# Patient Record
Sex: MALE | Race: WHITE | NOT HISPANIC OR LATINO | Employment: FULL TIME | URBAN - METROPOLITAN AREA
[De-identification: names, ages, dates, MRNs, and addresses within clinical notes are randomized per-mention and may not be internally consistent; named-entity substitution may affect disease eponyms.]

---

## 2017-01-06 ENCOUNTER — APPOINTMENT (OUTPATIENT)
Dept: URGENT CARE | Facility: MEDICAL CENTER | Age: 57
End: 2017-01-06
Payer: OTHER MISCELLANEOUS

## 2017-01-06 ENCOUNTER — TRANSCRIBE ORDERS (OUTPATIENT)
Dept: URGENT CARE | Facility: MEDICAL CENTER | Age: 57
End: 2017-01-06

## 2017-01-06 ENCOUNTER — HOSPITAL ENCOUNTER (OUTPATIENT)
Dept: RADIOLOGY | Facility: MEDICAL CENTER | Age: 57
Discharge: HOME | End: 2017-01-06

## 2017-01-06 DIAGNOSIS — R52 PAIN: ICD-10-CM

## 2017-01-06 DIAGNOSIS — R52 PAIN: Primary | ICD-10-CM

## 2017-01-06 PROCEDURE — 99283 EMERGENCY DEPT VISIT LOW MDM: CPT

## 2017-01-06 PROCEDURE — G0382 LEV 3 HOSP TYPE B ED VISIT: HCPCS

## 2017-01-06 PROCEDURE — 72100 X-RAY EXAM L-S SPINE 2/3 VWS: CPT

## 2017-01-09 ENCOUNTER — APPOINTMENT (OUTPATIENT)
Dept: URGENT CARE | Facility: MEDICAL CENTER | Age: 57
End: 2017-01-09
Payer: OTHER MISCELLANEOUS

## 2017-01-09 PROCEDURE — 99212 OFFICE O/P EST SF 10 MIN: CPT

## 2017-01-31 ENCOUNTER — APPOINTMENT (OUTPATIENT)
Dept: URGENT CARE | Facility: MEDICAL CENTER | Age: 57
End: 2017-01-31
Payer: OTHER MISCELLANEOUS

## 2017-01-31 PROCEDURE — 99213 OFFICE O/P EST LOW 20 MIN: CPT

## 2017-02-07 ENCOUNTER — APPOINTMENT (OUTPATIENT)
Dept: URGENT CARE | Facility: MEDICAL CENTER | Age: 57
End: 2017-02-07
Payer: OTHER MISCELLANEOUS

## 2017-02-07 PROCEDURE — 99213 OFFICE O/P EST LOW 20 MIN: CPT

## 2017-02-15 ENCOUNTER — APPOINTMENT (OUTPATIENT)
Dept: URGENT CARE | Facility: MEDICAL CENTER | Age: 57
End: 2017-02-15
Payer: OTHER MISCELLANEOUS

## 2017-02-15 PROCEDURE — 99203 OFFICE O/P NEW LOW 30 MIN: CPT

## 2017-02-27 ENCOUNTER — ALLSCRIPTS OFFICE VISIT (OUTPATIENT)
Dept: OTHER | Facility: OTHER | Age: 57
End: 2017-02-27

## 2017-03-06 DIAGNOSIS — M54.50 LOW BACK PAIN: ICD-10-CM

## 2017-03-06 DIAGNOSIS — M54.9 DORSALGIA: ICD-10-CM

## 2017-03-09 ENCOUNTER — GENERIC CONVERSION - ENCOUNTER (OUTPATIENT)
Dept: OTHER | Facility: OTHER | Age: 57
End: 2017-03-09

## 2017-03-29 ENCOUNTER — ALLSCRIPTS OFFICE VISIT (OUTPATIENT)
Dept: OTHER | Facility: OTHER | Age: 57
End: 2017-03-29

## 2017-04-05 ENCOUNTER — APPOINTMENT (OUTPATIENT)
Dept: URGENT CARE | Facility: MEDICAL CENTER | Age: 57
End: 2017-04-05
Payer: OTHER MISCELLANEOUS

## 2017-04-05 PROCEDURE — 99213 OFFICE O/P EST LOW 20 MIN: CPT

## 2017-04-07 ENCOUNTER — GENERIC CONVERSION - ENCOUNTER (OUTPATIENT)
Dept: OTHER | Facility: OTHER | Age: 57
End: 2017-04-07

## 2017-04-13 ENCOUNTER — APPOINTMENT (OUTPATIENT)
Dept: URGENT CARE | Age: 57
End: 2017-04-13
Payer: OTHER MISCELLANEOUS

## 2017-04-13 PROCEDURE — 99213 OFFICE O/P EST LOW 20 MIN: CPT | Performed by: FAMILY MEDICINE

## 2017-04-19 DIAGNOSIS — M54.50 LOW BACK PAIN: ICD-10-CM

## 2017-04-19 DIAGNOSIS — M47.816 SPONDYLOSIS OF LUMBAR REGION WITHOUT MYELOPATHY OR RADICULOPATHY: ICD-10-CM

## 2017-04-19 DIAGNOSIS — M51.26 OTHER INTERVERTEBRAL DISC DISPLACEMENT, LUMBAR REGION: ICD-10-CM

## 2017-04-19 DIAGNOSIS — M54.9 DORSALGIA: ICD-10-CM

## 2017-04-21 ENCOUNTER — APPOINTMENT (OUTPATIENT)
Dept: URGENT CARE | Facility: MEDICAL CENTER | Age: 57
End: 2017-04-21
Payer: OTHER MISCELLANEOUS

## 2017-04-21 PROCEDURE — 99213 OFFICE O/P EST LOW 20 MIN: CPT

## 2017-05-05 ENCOUNTER — APPOINTMENT (OUTPATIENT)
Dept: URGENT CARE | Facility: MEDICAL CENTER | Age: 57
End: 2017-05-05
Payer: OTHER MISCELLANEOUS

## 2017-05-05 PROCEDURE — 99213 OFFICE O/P EST LOW 20 MIN: CPT

## 2017-05-10 ENCOUNTER — ALLSCRIPTS OFFICE VISIT (OUTPATIENT)
Dept: OTHER | Facility: OTHER | Age: 57
End: 2017-05-10

## 2017-05-11 ENCOUNTER — GENERIC CONVERSION - ENCOUNTER (OUTPATIENT)
Dept: OTHER | Facility: OTHER | Age: 57
End: 2017-05-11

## 2017-05-16 ENCOUNTER — GENERIC CONVERSION - ENCOUNTER (OUTPATIENT)
Dept: OTHER | Facility: OTHER | Age: 57
End: 2017-05-16

## 2017-05-24 ENCOUNTER — GENERIC CONVERSION - ENCOUNTER (OUTPATIENT)
Dept: OTHER | Facility: OTHER | Age: 57
End: 2017-05-24

## 2017-05-26 ENCOUNTER — GENERIC CONVERSION - ENCOUNTER (OUTPATIENT)
Dept: OTHER | Facility: OTHER | Age: 57
End: 2017-05-26

## 2017-05-26 DIAGNOSIS — M51.36 OTHER INTERVERTEBRAL DISC DEGENERATION, LUMBAR REGION: ICD-10-CM

## 2017-05-26 DIAGNOSIS — M47.816 SPONDYLOSIS OF LUMBAR REGION WITHOUT MYELOPATHY OR RADICULOPATHY: ICD-10-CM

## 2017-05-26 DIAGNOSIS — M54.50 LOW BACK PAIN: ICD-10-CM

## 2017-06-15 ENCOUNTER — GENERIC CONVERSION - ENCOUNTER (OUTPATIENT)
Dept: OTHER | Facility: OTHER | Age: 57
End: 2017-06-15

## 2017-08-21 ENCOUNTER — APPOINTMENT (OUTPATIENT)
Dept: LAB | Facility: MEDICAL CENTER | Age: 57
End: 2017-08-21
Payer: COMMERCIAL

## 2017-08-21 ENCOUNTER — TRANSCRIBE ORDERS (OUTPATIENT)
Dept: ADMINISTRATIVE | Facility: HOSPITAL | Age: 57
End: 2017-08-21

## 2017-08-21 DIAGNOSIS — I10 ESSENTIAL HYPERTENSION, BENIGN: Primary | ICD-10-CM

## 2017-08-21 DIAGNOSIS — Z12.5 SPECIAL SCREENING FOR MALIGNANT NEOPLASM OF PROSTATE: ICD-10-CM

## 2017-08-21 LAB
ALBUMIN SERPL BCP-MCNC: 4.1 G/DL (ref 3.5–5)
ALP SERPL-CCNC: 59 U/L (ref 46–116)
ALT SERPL W P-5'-P-CCNC: 28 U/L (ref 12–78)
ANION GAP SERPL CALCULATED.3IONS-SCNC: 7 MMOL/L (ref 4–13)
AST SERPL W P-5'-P-CCNC: 14 U/L (ref 5–45)
BILIRUB SERPL-MCNC: 0.77 MG/DL (ref 0.2–1)
BUN SERPL-MCNC: 11 MG/DL (ref 5–25)
CALCIUM SERPL-MCNC: 9.4 MG/DL (ref 8.3–10.1)
CHLORIDE SERPL-SCNC: 104 MMOL/L (ref 100–108)
CHOLEST SERPL-MCNC: 282 MG/DL (ref 50–200)
CO2 SERPL-SCNC: 26 MMOL/L (ref 21–32)
CREAT SERPL-MCNC: 0.88 MG/DL (ref 0.6–1.3)
GFR SERPL CREATININE-BSD FRML MDRD: 94 ML/MIN/1.73SQ M
GLUCOSE P FAST SERPL-MCNC: 102 MG/DL (ref 65–99)
HDLC SERPL-MCNC: 59 MG/DL (ref 40–60)
LDLC SERPL CALC-MCNC: 171 MG/DL (ref 0–100)
LDLC SERPL DIRECT ASSAY-MCNC: 185 MG/DL (ref 0–100)
POTASSIUM SERPL-SCNC: 4.1 MMOL/L (ref 3.5–5.3)
PROT SERPL-MCNC: 7.7 G/DL (ref 6.4–8.2)
PSA SERPL-MCNC: 1.5 NG/ML (ref 0–4)
SODIUM SERPL-SCNC: 137 MMOL/L (ref 136–145)
TRIGL SERPL-MCNC: 262 MG/DL

## 2017-08-21 PROCEDURE — 80061 LIPID PANEL: CPT | Performed by: FAMILY MEDICINE

## 2017-08-21 PROCEDURE — 80053 COMPREHEN METABOLIC PANEL: CPT | Performed by: FAMILY MEDICINE

## 2017-08-21 PROCEDURE — 83721 ASSAY OF BLOOD LIPOPROTEIN: CPT | Performed by: FAMILY MEDICINE

## 2017-08-21 PROCEDURE — 84153 ASSAY OF PSA TOTAL: CPT | Performed by: FAMILY MEDICINE

## 2017-08-21 PROCEDURE — 36415 COLL VENOUS BLD VENIPUNCTURE: CPT | Performed by: FAMILY MEDICINE

## 2018-01-12 NOTE — MISCELLANEOUS
Message   Recorded as Task   Date: 06/13/2017 08:14 AM, Created By: Jessy Cardoso   Task Name: Miscellaneous   Assigned To: Jessy Cardoso   Regarding Patient: Raquel Bustamante, Status: Active   CommentChris Heller - 13 Jun 2017 8:14 AM     TASK CREATED  PT LM W/SERVICE TO CX APPT FOR 6/19/17  NO REASON PROVIDED   Renita Piña - 15 Nima 2017 7:08 AM     TASK REPLIED TO: Previously Assigned To Renita Piña        Active Problems    1  Abdominal pain (789 00) (R10 9)   2  Acute maxillary sinusitis (461 0) (J01 00)   3  Anxiety (300 00) (F41 9)   4  Arthritis (716 90) (M19 90)   5  Backache (724 5) (M54 9)   6  Benign prostatic hypertrophy (600 00) (N40 0)   7  Bulging of lumbar intervertebral disc without myelopathy (722 10) (M51 26)   8  Cataract (366 9) (H26 9)   9  Concentration deficit (799 51) (R41 840)   10  Depression (311) (F32 9)   11  Disc degeneration, lumbar (722 52) (M51 36)   12  Hyperlipidemia (272 4) (E78 5)   13  Hypertension (401 9) (I10)   14  Lumbar back pain (724 2) (M54 5)   15  Lumbar spondylosis (721 3) (M47 816)   16  Memory loss, short term (780 93) (R41 3)   17  Need for prophylactic vaccination and inoculation against influenza (V04 81) (Z23)   18  Other acute sinusitis (461 8) (J01 80)   19  Seasonal allergies (477 9) (J30 2)   20  Shoulder Pain Elicited During Impingement Test    Current Meds   1  Flexeril 5 MG TABS (Cyclobenzaprine HCl); TAKE 1 TABLET 3 TIMES DAILY AS   NEEDED; Therapy: (Recorded:32Vlx3455) to Recorded   2  Meloxicam 15 MG Oral Tablet; TAKE 1 TABLET ONCE DAILY WITH A MEAL; Therapy: 89CUO9920 to (Evaluate:27Jun2017)  Requested for: 99OOQ7082; Last   Rx:29Mar2017 Ordered   3  Multivitamins CAPS Recorded   4  Tamsulosin HCl - 0 4 MG Oral Capsule; take 1 capsule daily; Therapy: 42Flk4572 to (Evaluate:44Xzn3635)  Requested for: 27ULY5425; Last   Rx:23Jan2014 Ordered    Allergies    1  Penicillins    Signatures   Electronically signed by :  Dee Bloch Isela Butcher, ; Nima 15 2017  7:36AM EST                       (Author)

## 2018-01-12 NOTE — MISCELLANEOUS
Message   Recorded as Task   Date: 2017 11:35 AM, Created By: Jessica Amaya   Task Name: Miscellaneous   Assigned To: Sergo Gomez clinical,Team   Regarding Patient: Nicole Bryson, Status: Active   Comment:    Anastacia Denny - 26 May 2017 11:35 AM     TASK CREATED  Leidy from [facility] [address]  called stating pt has an appt today at 2:00 and PT script   Pls fax new PT script (dated today) to [phone]  Edwinmarsha Burrell can be reached at [phone]  Ada Mackenzie - 26 May 2017 11:47 AM     TASK EDITED        ***Dr Francoise Rothman pt***    Can you put a new PT script in allscipts, looks like  wrote the last one on  to continue PT for 2 more wks and [facility] is now requesting another one to be faxed today for pt's 2pm appt  Wolfgang Marlow - 26 May 2017 12:23 PM     TASK REPLIED TO: Previously Assigned To Wolfgang Marlow  I put in a new PT order to last until his next OV with Seadrift Wenceslao  Thank you  Ada Mackenzie - 26 May 2017 12:40 PM     TASK EDITED  New PT script was faxed to number provided for [facility], chris Forbes  Active Problems    1  Abdominal pain (789 00) (R10 9)   2  Acute maxillary sinusitis (461 0) (J01 00)   3  Anxiety (300 00) (F41 9)   4  Arthritis (716 90) (M19 90)   5  Backache (724 5) (M54 9)   6  Benign prostatic hypertrophy (600 00) (N40 0)   7  Bulging of lumbar intervertebral disc without myelopathy (722 10) (M51 26)   8  Cataract (366 9) (H26 9)   9  Concentration deficit (799 51) (R41 840)   10  Depression (311) (F32 9)   11  Disc degeneration, lumbar (722 52) (M51 36)   12  Hyperlipidemia (272 4) (E78 5)   13  Hypertension (401 9) (I10)   14  Lumbar back pain (724 2) (M54 5)   15  Lumbar spondylosis (721 3) (M47 816)   16  Memory loss, short term (780 93) (R41 3)   17  Need for prophylactic vaccination and inoculation against influenza (V04 81) (Z23)   18  Other acute sinusitis (461 8) (J01 80)   19   Seasonal allergies (477 9) (J30 2) 20  Shoulder Pain Elicited During Impingement Test    Current Meds   1  Flexeril 5 MG TABS (Cyclobenzaprine HCl); TAKE 1 TABLET 3 TIMES DAILY AS   NEEDED; Therapy: (Recorded:43Pbw7913) to Recorded   2  Meloxicam 15 MG Oral Tablet; TAKE 1 TABLET ONCE DAILY WITH A MEAL; Therapy: 14GYE1774 to (Evaluate:27Jun2017)  Requested for: 28KGF2584; Last   Rx:29Mar2017 Ordered   3  Multivitamins CAPS Recorded   4  Tamsulosin HCl - 0 4 MG Oral Capsule; take 1 capsule daily; Therapy: 41Grn5077 to (Evaluate:23May2014)  Requested for: 65SUL8999; Last   Rx:23Jan2014 Ordered    Allergies    1   Penicillins    Signatures   Electronically signed by : Yunior Ontiveros, ; May 26 2017 12:40PM EST                       (Author)

## 2018-01-13 VITALS
HEIGHT: 73 IN | WEIGHT: 210 LBS | RESPIRATION RATE: 16 BRPM | SYSTOLIC BLOOD PRESSURE: 162 MMHG | DIASTOLIC BLOOD PRESSURE: 88 MMHG | HEART RATE: 96 BPM | BODY MASS INDEX: 27.83 KG/M2

## 2018-01-13 NOTE — CONSULTS
Assessment    1  Backache (724 5) (M54 9)   2  Lumbar back pain (724 2) (M54 5)   3  Disc degeneration, lumbar (722 52) (M51 36)    Plan  Disc degeneration, lumbar    · Follow-up PRN Evaluation and Treatment  Follow-up  Status: Complete  Done:  30AOO8479   Ordered; For: Disc degeneration, lumbar; Ordered By: Leda Lawson Performed:  Due: 09XCP4575   · 1 - Scott Thakur MD, Bill Lebron (Pain Management) Physician Referral  Consult Only: the  expectation is that the referring provider will communicate back to the patient on  treatment options  Evaluation and Treatment: the expectation is that the referred to  provider will communicate back to the patient on treatment options  please assess for back pain  Status: Hold For - Scheduling  Requested for: 14YOT7252   Ordered; For: Disc degeneration, lumbar; Ordered By: Leda Lawson Performed:  Due: 97CSZ5510; Last Updated By: Michelle Martinez; 2/27/2017 9:25:28 AM  Care Summary provided  : Yes    Discussion/Summary    Mr Brynn Terry is being referred by Dr Parish Zeng  He has acute on chronic back pain that is improving with conservative medical management  today we reviewed these options including PT, TARYN and medications  I explained to him that surgery would be no guarantee of pain relief  I encouraged him to continue to pursue PT and referred him to Dr Scott Thakur  I will see him on a PRN basis  The patient has the current Goals: Improve function  The patent has the current Barriers: None  Patient is able to Self-Care  Self Referrals: No      Chief Complaint    1  Back Pain  Chronic low back pain with acute exacerbation x 6 weeks, episodic right leg radiation, improved with activity modification and PT  denies weakness, bowel or bladder  History of Present Illness    [facility] presents with complaints of gradual onset of constant episodes of mild bilateral lower back pain, described as dull and aching, radiating to the right buttock   On a scale of 1 to 10, the patient rates the pain as 3  Episodes about years ago  Symptoms are made worse by prolonged standing, prolonged sitting and lifting  Symptoms are worsening  Associated symptoms include spasm and stiffness, but no fever, no night sweats, no abdominal pain, no general malaise, no weight loss, no arm numbness, no leg numbness, no arm weakness, no urinary incontinence, no fecal incontinence, no urinary retention and no rash localized to the area of pain    leg weakness (occasional bilateral LE weakness)  Review of Systems    Constitutional: no fever and no chills  Eyes: no eyesight problems  ENT: no hearing loss  Cardiovascular: no chest pain and no palpitations  Respiratory: no shortness of breath, no cough and no wheezing  Gastrointestinal: no nausea  Genitourinary: no incontinence  Musculoskeletal: joint stiffness, but as noted in HPI, no joint swelling, no limb pain, no myalgias and no limb swelling    The patient presents with complaints of gradual onset of mild lower back arthralgias  Neurological: limb weakness, but as noted in HPI, no headache, no numbness, no tingling, no confusion, no dizziness, no convulsions, no fainting and no difficulty walking  Psychiatric: anxiety, but no depression  Endocrine: no muscle weakness  Hematologic/Lymphatic: no tendency for easy bleeding and no tendency for easy bruising  ROS reviewed  Active Problems    1  Abdominal pain (789 00) (R10 9)   2  Acute maxillary sinusitis (461 0) (J01 00)   3  Backache (724 5) (M54 9)   4  Benign prostatic hypertrophy (600 00) (N40 0)   5  Depression (311) (F32 9)   6  Hyperlipidemia (272 4) (E78 5)   7  Hypertension (401 9) (I10)   8  Need for prophylactic vaccination and inoculation against influenza (V04 81) (Z23)   9  Other acute sinusitis (461 8) (J01 80)   10  Shoulder Pain Elicited During Impingement Test    Past Medical History    1  History of Denial Of Any Significant Medical History   2   Need for prophylactic vaccination and inoculation against influenza (V04 81) (Z23)   3  History of Urinary Tract Infection (V13 02)    The active problems and past medical history were reviewed and updated today  Surgical History    1  History of Eye Surgery   2  History of Sinus Surgery    The surgical history was reviewed and updated today  Family History  Mother    1  Family history of Diabetes Mellitus (V18 0)   2  Family history of Father  At Age 78  Father    3  Family history of Alzheimer Disease  Family History    4  Family history of Family Health Status Of Mother - Alive    The family history was reviewed and updated today  Social History    · Being A Social Drinker   · Caffeine Use   · Current Every Day Smoker (305 1)  The social history was reviewed and updated today  Current Meds   1  Flexeril 5 MG TABS; TAKE 1 TABLET 3 TIMES DAILY AS NEEDED; Therapy: (Recorded:83Pst5987) to Recorded   2  Multivitamins CAPS Recorded   3  Naproxen 250 MG Oral Tablet; TAKE 1 TABLET EVERY 12 HOURS AS NEEDED; Therapy: (Recorded:97Gaz1807) to Recorded   4  Tamsulosin HCl - 0 4 MG Oral Capsule; take 1 capsule daily; Therapy: 73Nwf7863 to (Evaluate:95Qkf5841)  Requested for: 58QJF4062; Last   Rx:2014 Ordered    Allergies    1  Penicillins    Vitals  Vital Signs    Recorded: 2017 08:55AM   Temperature 98 7 F   Heart Rate 103   Respiration 16   Systolic 187   Diastolic 485   Height 6 ft 1 in   Weight 206 lb 2 oz   BMI Calculated 27 2   BSA Calculated 2 18   Pain Scale 3     Physical Exam   (full strength bilateral LE, grossly intact sensatoin and DTR, negative SLR, mildly antalgic gait, paravertebral spasm, able to toe and heel walk)   Constitutional Patient appears healthy and well developed  No signs of acute distress present  Eyes Vision is grossly normal  Discs flat with sharp margins  Respiratory Auscultation of lungs: Clear to auscultation bilaterally     Cardiovascular Auscultation of heart: Rate is regular  Rhythm is regular  No heart murmur appreciated  Carotid pulses: 2+ and equal bilaterally, without bruits  Peripheral vascular exam: Pedal Pulses: 2+ and equal bilaterally  Radial pulses: 2+ and equal bilaterally  Extremities: No edema of the lower limbs bilaterally  Abdomen The abdomen is flat  No abdominal scars  Abdomen is soft, nontender, and nondistended  Anus, perineum, and rectum: Exam deferred  Neurologic - Mental Status: Alert and Oriented x3  Mood and affect: Affect is normal   Memory is grossly intact  Attention is WNL  Speech is articulated and fluent  Knowledge and vocabulary consistent with education  Cranial Nerve Exam:  2nd cranial nerve: Visual field was full to confrontation  3rd, 4th, and 6th cranial nerves: Normal with no deficit  5th cranial nerve: Sensation was intact in all three divisions to light touch and temperature  Motor function was intact  7th cranial nerve: Face symmetrical at grimace and at rest  8th cranial nerve: Grossly intact to finger rub bilaterally  9th and 10th cranial nerves: Uvula is midline  11th cranial nerve: Shoulder shrug equal bilaterally  12th cranial nerve: Tongue mideline, no atrophy present  Motor System General Motor Strength: No pronator drift and no parietal drift  Motor System - Upper Extremities: Normal to inspection and palpation  Strength: Deltoids 5/5 bilaterally  Biceps 5/5 bilaterally  Triceps 5/5 bilaterally  Extensor carpi radials is 5/5 bilaterally  Extensor digitorum 5/5 bilaterally  Intrinsic 5/5 bilaterally   5/5 bilaterally  Muscle tone: Normal bilaterally  Muscle Bulk: Normal bilaterally  Motor System - Lower Extremities: Normal to inspection and palpation  Strength: Iliopsoas 5/5 bilaterally  Quadriceps 5/5 bilaterally  Hamstrings 5/5 bilaterally  Gastrocnemius 5/5 bilaterally  Muscle tone: Normal bilaterally  Reflexes: Biceps reflexes are 2+ bilaterally   Triceps reflexes are 2+ bilaterally  Achilles reflexes are 2+ bilaterally  Babinski's reflex is 2+ down going bilaterally  Ankle clonus is absent bilaterally  Coordination: Finger to nose was normal    Sensory: Sensation grossly intact to light touch  Sensation grossly intact to light touch  Gait and Station: [place] with a normal gait  Results/Data    I personally reviewed the MRI in detail with the patient  MRI Review mild multilevel lumbar disc degeneration with associated lateral recess stenosis, no significant central stenosis/listhesis        Signatures   Electronically signed by : SARIKA Pierre ; Feb 27 2017  9:31AM EST                       (Author)

## 2018-01-14 VITALS
HEIGHT: 73 IN | DIASTOLIC BLOOD PRESSURE: 86 MMHG | RESPIRATION RATE: 16 BRPM | WEIGHT: 211 LBS | SYSTOLIC BLOOD PRESSURE: 134 MMHG | BODY MASS INDEX: 27.96 KG/M2 | TEMPERATURE: 98.4 F | HEART RATE: 90 BPM

## 2018-01-15 VITALS
BODY MASS INDEX: 27.32 KG/M2 | SYSTOLIC BLOOD PRESSURE: 159 MMHG | TEMPERATURE: 98.7 F | RESPIRATION RATE: 16 BRPM | WEIGHT: 206.13 LBS | HEIGHT: 73 IN | HEART RATE: 103 BPM | DIASTOLIC BLOOD PRESSURE: 110 MMHG

## 2018-01-16 NOTE — RESULT NOTES
Message   Recorded as Task   Date: 04/04/2017 12:14 PM, Created By: Leanne Castellon   Task Name: Miscellaneous   Assigned To: Brionna Owusu clinical,Team   Regarding Patient: David Zavala, Status: Active   CommentJerri Merida - 04 Apr 2017 12:14 PM     TASK CREATED  Patient called and stated that he needs a note for w/comp stating that you have restricted his duty for work  He also stated that the doctor he saw before you did it for him and he needs a recent note stating the restrictions  Thank you   Barber Yuen - 05 Apr 2017 4:38 PM     TASK REPLIED TO: Previously Assigned To CYRUS Guerrero clinical,Team  can you find out what his current restrictions are   Ada Mackenzie - 06 Apr 2017 9:29 AM     TASK EDITED  Left marissa at [facility] at [facility],  Lan Mclean, that Dr Antonette Rodriguez just saw pt for consult on 3/29/17 and he is asking what pt's current restrictions are? Asked if she could c/b and provide that info  C/B number proivided  Ada Mackenzie - 06 Apr 2017 9:29 AM     TASK IN PROGRESS   Ada Mackenzie - 06 Apr 2017 2:33 PM     TASK EDITED  S/W Nusrat at [facility], she said the pt was seen by Dolly José the PA at [facility] and he gave pt the following work restrictions:  no pushing, pulling or carrying >25lbs,  sitting as needed for pain,  no work below waist level,  ok to drive  Nusrat asked if Dr Antonette Rodriguez will be taking over writing the work restrictions b/c usually once the pt goes to a specialist they take over the restrictions  She said so far none of the specialist has agreed to write the restrictions, the pt's has been seen by multiple PAs there who have asuncion writing them  She was asking if you will be taking it over? [facility] ph # [phone]     Barber Yuen - 06 Apr 2017 8:32 PM     TASK REPLIED TO: Previously Assigned To Barber Yuen  we will re-evaluate the restrictions at the next office and advance as necessary   Yanet Katz - 07 Apr 2017 9:58 AM TASK EDITED  S/w Nusrat and she is aware          Signatures   Electronically signed by : Heydi Navarro, ; Apr 7 2017  9:59AM EST                       (Author)

## 2018-01-16 NOTE — MISCELLANEOUS
Message   Recorded as Task   Date: 05/11/2017 10:49 AM, Created By: Vivian Beach   Task Name: Miscellaneous   Assigned To: Madalyn Gonsalves clinical,Team   Regarding Patient: Noah Pal, Status: Active   CommentValene Cap - 11 May 2017 10:49 AM     TASK CREATED  Received a note for work at his visit yesterday and would like to speak to someone about what was written  Please call [phone]  Keyanna Caro - 11 May 2017 11:18 AM     TASK EDITED  Spoke to pt, he states that at his appt yesterday Bradley Mejia said he would be out of work until the end of the month  When he got home he read the note and it says until 5/22/17  Pt also requesting a new order for physical therapy  Per the therapist at [facility] he would benefit from more therapy  Raisa Elaine - 11 May 2017 11:30 AM     TASK REPLIED TO: Previously Assigned To Raisa Elaine  i said 2 weeks   Keyanna Caro - 11 May 2017 12:01 PM     TASK EDITED  Would you like physical therapy extended? If so he will need a new order  Raisa Elaine - 11 May 2017 1:04 PM     TASK REPLIED TO: Previously Assigned To Raisa Elaine  new PT ordered   Keyanna Caro - 11 May 2017 1:40 PM     TASK EDITED  Pt aware of both the note and pt order  Requested PT order to be faxed to [facility] fax#[phone]  Fax over as requested to attn:jeb  Active Problems    1  Abdominal pain (789 00) (R10 9)   2  Acute maxillary sinusitis (461 0) (J01 00)   3  Anxiety (300 00) (F41 9)   4  Arthritis (716 90) (M19 90)   5  Backache (724 5) (M54 9)   6  Benign prostatic hypertrophy (600 00) (N40 0)   7  Bulging of lumbar intervertebral disc without myelopathy (722 10) (M51 26)   8  Cataract (366 9) (H26 9)   9  Concentration deficit (799 51) (R41 840)   10  Depression (311) (F32 9)   11  Disc degeneration, lumbar (722 52) (M51 36)   12  Hyperlipidemia (272 4) (E78 5)   13  Hypertension (401 9) (I10)   14   Lumbar back pain (724 2) (M54 5)   15  Lumbar spondylosis (721 3) (M47 816)   16  Memory loss, short term (780 93) (R41 3)   17  Need for prophylactic vaccination and inoculation against influenza (V04 81) (Z23)   18  Other acute sinusitis (461 8) (J01 80)   19  Seasonal allergies (477 9) (J30 2)   20  Shoulder Pain Elicited During Impingement Test    Current Meds   1  Flexeril 5 MG TABS (Cyclobenzaprine HCl); TAKE 1 TABLET 3 TIMES DAILY AS   NEEDED; Therapy: (Recorded:50Pdd7597) to Recorded   2  Meloxicam 15 MG Oral Tablet; TAKE 1 TABLET ONCE DAILY WITH A MEAL; Therapy: 24IMC7304 to (Evaluate:27Jun2017)  Requested for: 30DJZ8740; Last   Rx:29Mar2017 Ordered   3  Multivitamins CAPS Recorded   4  Tamsulosin HCl - 0 4 MG Oral Capsule; take 1 capsule daily; Therapy: 30Nqp0600 to (Evaluate:23May2014)  Requested for: 10QLR0989; Last   Rx:23Jan2014 Ordered    Allergies    1   Penicillins    Signatures   Electronically signed by : Lan Alexis RN; May 11 2017  1:41PM EST                       (Author)

## 2022-02-16 ENCOUNTER — APPOINTMENT (OUTPATIENT)
Dept: LAB | Facility: MEDICAL CENTER | Age: 62
End: 2022-02-16
Payer: COMMERCIAL

## 2022-02-16 DIAGNOSIS — N40.0 BENIGN PROSTATIC HYPERPLASIA, UNSPECIFIED WHETHER LOWER URINARY TRACT SYMPTOMS PRESENT: ICD-10-CM

## 2022-02-16 DIAGNOSIS — I10 HYPERTENSION, ESSENTIAL: ICD-10-CM

## 2022-02-16 LAB
ALBUMIN SERPL BCP-MCNC: 4.2 G/DL (ref 3.5–5)
ALP SERPL-CCNC: 65 U/L (ref 46–116)
ALT SERPL W P-5'-P-CCNC: 29 U/L (ref 12–78)
ANION GAP SERPL CALCULATED.3IONS-SCNC: 5 MMOL/L (ref 4–13)
AST SERPL W P-5'-P-CCNC: 16 U/L (ref 5–45)
BILIRUB SERPL-MCNC: 0.72 MG/DL (ref 0.2–1)
BUN SERPL-MCNC: 11 MG/DL (ref 5–25)
CALCIUM SERPL-MCNC: 9.4 MG/DL (ref 8.3–10.1)
CHLORIDE SERPL-SCNC: 107 MMOL/L (ref 100–108)
CHOLEST SERPL-MCNC: 298 MG/DL
CO2 SERPL-SCNC: 25 MMOL/L (ref 21–32)
CREAT SERPL-MCNC: 0.96 MG/DL (ref 0.6–1.3)
GFR SERPL CREATININE-BSD FRML MDRD: 83 ML/MIN/1.73SQ M
GLUCOSE P FAST SERPL-MCNC: 111 MG/DL (ref 65–99)
HDLC SERPL-MCNC: 58 MG/DL
LDLC SERPL CALC-MCNC: 207 MG/DL (ref 0–100)
POTASSIUM SERPL-SCNC: 4.3 MMOL/L (ref 3.5–5.3)
PROT SERPL-MCNC: 8.1 G/DL (ref 6.4–8.2)
PSA SERPL-MCNC: 1.8 NG/ML (ref 0–4)
SODIUM SERPL-SCNC: 137 MMOL/L (ref 136–145)
TRIGL SERPL-MCNC: 165 MG/DL

## 2022-02-16 PROCEDURE — 80061 LIPID PANEL: CPT

## 2022-02-16 PROCEDURE — 80053 COMPREHEN METABOLIC PANEL: CPT

## 2022-02-16 PROCEDURE — 84153 ASSAY OF PSA TOTAL: CPT

## 2022-04-05 ENCOUNTER — HOSPITAL ENCOUNTER (EMERGENCY)
Facility: HOSPITAL | Age: 62
Discharge: HOME | End: 2022-04-05
Attending: EMERGENCY MEDICINE

## 2022-04-05 VITALS
HEART RATE: 96 BPM | DIASTOLIC BLOOD PRESSURE: 97 MMHG | OXYGEN SATURATION: 96 % | WEIGHT: 193.56 LBS | RESPIRATION RATE: 20 BRPM | TEMPERATURE: 98.8 F | BODY MASS INDEX: 25.54 KG/M2 | SYSTOLIC BLOOD PRESSURE: 139 MMHG

## 2022-04-05 DIAGNOSIS — R04.0 RIGHT-SIDED EPISTAXIS: Primary | ICD-10-CM

## 2022-04-05 PROCEDURE — 30903 CONTROL OF NOSEBLEED: CPT | Performed by: EMERGENCY MEDICINE

## 2022-04-05 PROCEDURE — 99284 EMERGENCY DEPT VISIT MOD MDM: CPT | Performed by: EMERGENCY MEDICINE

## 2022-04-05 PROCEDURE — 99283 EMERGENCY DEPT VISIT LOW MDM: CPT

## 2022-04-05 RX ORDER — TRANEXAMIC ACID 100 MG/ML
1000 INJECTION, SOLUTION INTRAVENOUS ONCE
Status: COMPLETED | OUTPATIENT
Start: 2022-04-05 | End: 2022-04-05

## 2022-04-05 RX ORDER — OXYMETAZOLINE HYDROCHLORIDE 0.05 G/100ML
2 SPRAY NASAL ONCE
Status: COMPLETED | OUTPATIENT
Start: 2022-04-05 | End: 2022-04-05

## 2022-04-05 RX ORDER — CEPHALEXIN 250 MG/1
500 CAPSULE ORAL ONCE
Status: COMPLETED | OUTPATIENT
Start: 2022-04-05 | End: 2022-04-05

## 2022-04-05 RX ORDER — CEPHALEXIN 500 MG/1
500 CAPSULE ORAL EVERY 12 HOURS SCHEDULED
Qty: 10 CAPSULE | Refills: 0 | Status: SHIPPED | OUTPATIENT
Start: 2022-04-05 | End: 2022-04-10

## 2022-04-05 RX ADMIN — CEPHALEXIN 500 MG: 250 CAPSULE ORAL at 09:44

## 2022-04-05 RX ADMIN — TRANEXAMIC ACID 1000 MG: 1 INJECTION, SOLUTION INTRAVENOUS at 09:15

## 2022-04-05 RX ADMIN — OXYMETAZOLINE HCL 2 SPRAY: 0.05 SPRAY NASAL at 09:15

## 2022-04-05 NOTE — ED PROVIDER NOTES
History  Chief Complaint   Patient presents with    Alice Sauce     began around 0600 today  Takes baby asa daily  Unable to stop bleeding, clots noted       History provided by:  Patient   used: No    Nose Bleed  Associated symptoms: no congestion, no cough, no dizziness, no fever, no headaches and no sore throat      Patient is a 57-year-old male presenting to ER with nosebleed  Started suddenly today morning  No recent trauma  History of trauma to the nose and face years ago, had surgery done  Has not seen ENT recently  Takes aspirin  No other anticoagulant or blood thinner  Denies lightheadedness or dizziness  No chest pain or short of breath     mdm epistaxis      None       No past medical history on file  No past surgical history on file  Family History   Problem Relation Age of Onset    No Known Problems Mother     No Known Problems Father      I have reviewed and agree with the history as documented  E-Cigarette/Vaping    E-Cigarette Use Never User      E-Cigarette/Vaping Substances     Social History     Tobacco Use    Smoking status: Current Every Day Smoker     Packs/day: 1 50     Types: Cigarettes    Smokeless tobacco: Never Used   Vaping Use    Vaping Use: Never used   Substance Use Topics    Alcohol use: Yes     Comment: heavy; nearly everyday per Susanna Ax Drug use: Not on file     Comment: no use       Review of Systems   Constitutional: Negative for chills, diaphoresis and fever  HENT: Positive for nosebleeds  Negative for congestion and sore throat  Respiratory: Negative for cough, shortness of breath, wheezing and stridor  Cardiovascular: Negative for chest pain, palpitations and leg swelling  Gastrointestinal: Negative for abdominal pain, blood in stool, diarrhea, nausea and vomiting  Genitourinary: Negative for dysuria, frequency and urgency  Musculoskeletal: Negative for neck pain and neck stiffness     Skin: Negative for pallor and rash    Neurological: Negative for dizziness, syncope, weakness, light-headedness and headaches  All other systems reviewed and are negative  Physical Exam  Physical Exam  Vitals reviewed  Constitutional:       Appearance: Normal appearance  He is well-developed  HENT:      Head: Normocephalic and atraumatic  Nose:      Comments: Nosebleed from right-sided  Eyes:      Extraocular Movements: Extraocular movements intact  Pupils: Pupils are equal, round, and reactive to light  Cardiovascular:      Rate and Rhythm: Normal rate and regular rhythm  Pulmonary:      Effort: Pulmonary effort is normal  No respiratory distress  Musculoskeletal:         General: No swelling or tenderness  Normal range of motion  Cervical back: Normal range of motion and neck supple  Skin:     General: Skin is warm and dry  Capillary Refill: Capillary refill takes less than 2 seconds  Neurological:      General: No focal deficit present  Mental Status: He is alert and oriented to person, place, and time  Vital Signs  ED Triage Vitals [04/05/22 0905]   Temperature Pulse Respirations Blood Pressure SpO2   98 8 °F (37 1 °C) 96 20 139/97 96 %      Temp Source Heart Rate Source Patient Position - Orthostatic VS BP Location FiO2 (%)   Axillary Monitor Sitting Right arm --      Pain Score       --           Vitals:    04/05/22 0905   BP: 139/97   Pulse: 96   Patient Position - Orthostatic VS: Sitting         Visual Acuity      ED Medications  Medications   oxymetazoline (AFRIN) 0 05 % nasal spray 2 spray (2 sprays Each Nare Given by Other 4/5/22 0915)   tranexamic acid 100mg/mL (for epistaxis) 1,000 mg (1,000 mg Nasal Given by Other 4/5/22 0915)   cephalexin (KEFLEX) capsule 500 mg (500 mg Oral Given 4/5/22 0944)       Diagnostic Studies  Results Reviewed     None                 No orders to display              Procedures  Epistaxis management    Date/Time: 4/5/2022 10:21 AM  Performed by:  Sho Mortimer January, MD  Authorized by: Tabitha Chavarria MD   Universal Protocol:  Consent given by: patient  Patient understanding: patient states understanding of the procedure being performed  Patient identity confirmed: verbally with patient      Patient location:  ED  Anesthesia (see MAR for exact dosages): Anesthesia method:  None  Procedure details:     Treatment site:  R anterior    Hemostasis method:  Merocel sponge (aftrin, txa)    Approach:  External    Treatment complexity:  Limited    Treatment episode: initial    Post-procedure details:     Assessment:  Bleeding stopped    Patient tolerance of procedure: Tolerated well, no immediate complications             ED Course  ED Course as of 04/05/22 1806   Tue Apr 05, 2022   1009 Bleeding has stopped  Discussed with Dr Karla Riley from ent, can follow-up in 2-3 days, antibiotics                                             MDM    Disposition  Final diagnoses:   Right-sided epistaxis     Time reflects when diagnosis was documented in both MDM as applicable and the Disposition within this note     Time User Action Codes Description Comment    4/5/2022 10:42 AM Mortimer January, Ara Add [R04 0] Right-sided epistaxis       ED Disposition     ED Disposition Condition Date/Time Comment    Discharge Stable Tue Apr 5, 2022 10:42 AM Katey Casey discharge to home/self care  Follow-up Information     Follow up With Specialties Details Why [address], DO Family Medicine In 3 days Re-evaluation [address]   [facility] [address] [address] Emergency Department Emergency Medicine  As needed, If symptoms worsen [address] [facility] [address] [facility] Emergency Department, [address] [facility], [address]    Nora Campbell MD Otolaryngology Schedule an appointment as soon as possible for a visit in 2 days Please call and get appointment to get seen in 2 days  [address]  [address] [place] [address]             Discharge Medication List as of 4/5/2022 10:44 AM      START taking these medications    Details   cephalexin (KEFLEX) 500 mg capsule Take 1 capsule (500 mg total) by mouth every 12 (twelve) hours for 5 days, Starting Tue 4/5/2022, Until Sun 4/10/2022, Normal             No discharge procedures on file      PDMP Review     None          ED Provider  Electronically Signed by           Torin Cardona MD  04/05/22 7851

## 2022-04-05 NOTE — DISCHARGE INSTRUCTIONS
Please follow-up with ENT in 2 days  Keep packing in place  Make sure to take your antibiotics    Return to ER if bleeding, fevers, lightheaded, chest pain, shortness of breath or feel worse overall

## 2022-04-20 ENCOUNTER — HOSPITAL ENCOUNTER (EMERGENCY)
Facility: HOSPITAL | Age: 62
Discharge: HOME | End: 2022-04-21
Attending: EMERGENCY MEDICINE | Admitting: EMERGENCY MEDICINE
Payer: COMMERCIAL

## 2022-04-20 DIAGNOSIS — R04.0 EPISTAXIS: Primary | ICD-10-CM

## 2022-04-20 PROCEDURE — 99283 EMERGENCY DEPT VISIT LOW MDM: CPT

## 2022-04-21 VITALS
HEART RATE: 78 BPM | TEMPERATURE: 98.1 F | RESPIRATION RATE: 18 BRPM | OXYGEN SATURATION: 97 % | DIASTOLIC BLOOD PRESSURE: 75 MMHG | SYSTOLIC BLOOD PRESSURE: 124 MMHG

## 2022-04-21 PROCEDURE — 99282 EMERGENCY DEPT VISIT SF MDM: CPT

## 2022-04-21 NOTE — ED PROVIDER NOTES
History  Chief Complaint   Patient presents with    Nose Bleed     pt arrived ems after having a nosebleed since 630pm tonight  Ems stated that his eyes were bleeding a small amount when they arrived  Pt stated he lost about 200cc of blood in EMS  -thinners, -trauma     The patient is a 78-year-old male with history of sinus surgery several years ago who presents to the emergency department for evaluation of right-sided epistaxis  The patient was evaluated in this facility with a similar presentation on 04/05/2022 and required morecel sponge insertion   He then saw ENT on 04/08/2022 during which he had cauterization to the right septum  He reports that he had some relief, however over the past several days, he has continued to experience episodes of epistaxis  He reports they commonly occur when he is eating or lying flat  Tonight, the patient was eating when it occurred  It began at 6:30 p m , occasionally stopped, but continue to return whenever the patient stopped applying pressure  The patient also attempted spraying Afrin in the narrow  He otherwise denies dizziness, lightheadedness, nausea, vomiting, abdominal pain, syncope, chest pain, dyspnea, dysphagia  Prior to Admission Medications   Prescriptions Last Dose Informant Patient Reported? Taking?   atorvastatin (LIPITOR) 20 mg tablet   Yes No   Sig: Take 20 mg by mouth daily   lisinopril (ZESTRIL) 10 mg tablet   Yes No   Sig: Take 10 mg by mouth daily      Facility-Administered Medications: None       Past Medical History:   Diagnosis Date    Nosebleed        Past Surgical History:   Procedure Laterality Date    SINUS SURGERY         Family History   Problem Relation Age of Onset    No Known Problems Mother     No Known Problems Father      I have reviewed and agree with the history as documented      E-Cigarette/Vaping    E-Cigarette Use Never User      E-Cigarette/Vaping Substances     Social History     Tobacco Use    Smoking status: Current Every Day Smoker     Packs/day: 1 50     Types: Cigarettes    Smokeless tobacco: Never Used   Vaping Use    Vaping Use: Never used   Substance Use Topics    Alcohol use: Yes     Comment: heavy; nearly everyday per Zina Lo Drug use: Not on file     Comment: no use       Review of Systems   Constitutional: Negative for chills and fever  HENT: Positive for nosebleeds  Negative for congestion, rhinorrhea and trouble swallowing  Eyes: Negative for photophobia and visual disturbance  Respiratory: Negative for cough and shortness of breath  Cardiovascular: Negative for chest pain and leg swelling  Gastrointestinal: Negative for abdominal pain, constipation, diarrhea, nausea and vomiting  Genitourinary: Negative for dysuria and flank pain  Musculoskeletal: Negative for arthralgias and myalgias  Skin: Negative for rash and wound  Neurological: Negative for dizziness, weakness, numbness and headaches  Psychiatric/Behavioral: Negative for behavioral problems  Physical Exam  Physical Exam  Vitals and nursing note reviewed  Constitutional:       General: He is not in acute distress  Appearance: He is well-developed  HENT:      Head: Normocephalic and atraumatic  Right Ear: Tympanic membrane and external ear normal       Left Ear: Tympanic membrane and external ear normal       Nose: No septal deviation or rhinorrhea  Right Nostril: No foreign body, epistaxis, septal hematoma or occlusion  Left Nostril: No foreign body, epistaxis, septal hematoma or occlusion  Comments: Dried blood in the right nare  No active bleeding     Mouth/Throat:      Mouth: Mucous membranes are moist       Pharynx: Oropharynx is clear  Tonsils: No tonsillar exudate  Comments: Small amount of blood in the posterior pharynx however no active bleeding   Airway intact  Eyes:      Conjunctiva/sclera: Conjunctivae normal    Cardiovascular:      Rate and Rhythm: Normal rate and regular rhythm  Heart sounds: No murmur heard  Pulmonary:      Effort: Pulmonary effort is normal  No respiratory distress  Breath sounds: Normal breath sounds  Musculoskeletal:         General: Normal range of motion  Cervical back: Neck supple  Skin:     General: Skin is warm and dry  Coloration: Skin is not pale  Neurological:      Mental Status: He is alert  Motor: No weakness  Psychiatric:         Mood and Affect: Mood normal          Vital Signs  ED Triage Vitals   Temperature Pulse Respirations Blood Pressure SpO2   04/20/22 2355 04/21/22 0000 04/21/22 0000 04/21/22 0000 04/21/22 0000   98 1 °F (36 7 °C) 78 18 124/75 97 %      Temp Source Heart Rate Source Patient Position - Orthostatic VS BP Location FiO2 (%)   04/20/22 2355 04/21/22 0000 04/21/22 0000 04/21/22 0000 --   Oral Monitor Lying Right arm       Pain Score       --                  Vitals:    04/21/22 0000   BP: 124/75   Pulse: 78   Patient Position - Orthostatic VS: Lying         Visual Acuity  Visual Acuity      Most Recent Value   L Pupil Size (mm) 2   R Pupil Size (mm) 2          ED Medications  Medications - No data to display    Diagnostic Studies  Results Reviewed     None                 No orders to display              Procedures  Procedures         ED Course           MDM     Patient presents to the emergency room for evaluation of right-sided epistaxis that began at 6:30 p m  Prior to arrival   Patient recently had cauterization of the right septum by ENT on 04/09/2022  He did have some resolution, however over the past several days has had recurrence of episodes of epistaxis  Denies lightheadedness dizziness, syncope  Patient is without dyspnea, cough, dysphagia  On exam, patient does not active epistaxis  Dried blood is noted in the right ear, small amount of blood in the posterior pharynx however no active bleeding      Patient was observed in the ED for significant amount of time without recurrence of epistaxis  Patient did blow his nose several times without recurrence, blood cleared from nare and pharynx  I discussed with the patient the importance of follow-up ENT as soon as possible tomorrow morning for prompt follow-up due to recurrence of symptoms  Patient verbalized understanding  I advised patient on nasal saline, vaseline in nare, and humidifier to avoid dry nares  At the time of discharge, the patient is in no acute distress  I discussed with the patient the diagnosis, treatment plan, follow-up, return precautions, and discharge instructions; they were given the opportunity to ask questions and verbalized understanding  Disposition  Final diagnoses:   Epistaxis     Time reflects when diagnosis was documented in both MDM as applicable and the Disposition within this note     Time User Action Codes Description Comment    4/21/2022  1:46 AM Ravin Sky [R04 0] Epistaxis       ED Disposition     ED Disposition Condition Date/Time Comment    Discharge Stable Thu Apr 21, 2022  1:46 AM Ирина Raw discharge to home/self care  Follow-up Information     Follow up With Specialties Details Why [address] [facility] [place], [address] [address]  [phone]       [facility] ENT Otolaryngology   [address] [facility] [address]  [address] ENT, [address] [place] [address], [address] [phone]          Discharge Medication List as of 4/21/2022  1:49 AM      CONTINUE these medications which have NOT CHANGED    Details   atorvastatin (LIPITOR) 20 mg tablet Take 20 mg by mouth daily, Starting Wed 3/2/2022, Historical Med      lisinopril (ZESTRIL) 10 mg tablet Take 10 mg by mouth daily, Starting Fri 1/28/2022, Historical Med             No discharge procedures on file      PDMP Review       Value Time User PDMP Reviewed  Yes 4/21/2022 12:45 AM Janina Daily MD          ED Provider  Electronically Signed by           Jett Branch PA-C  04/21/22 0700

## 2022-04-21 NOTE — DISCHARGE INSTRUCTIONS
Return for nose bleed not controled with pressure x 20 minutes, attempted twice    Return to the ENT as soon as possible - call ASAP tomorrow morning    Return to ER for lightheadedness, severe blood loss, passing out/lightheadedness

## 2023-01-20 ENCOUNTER — OFFICE VISIT (OUTPATIENT)
Dept: URGENT CARE | Facility: MEDICAL CENTER | Age: 63
End: 2023-01-20

## 2023-01-20 VITALS
SYSTOLIC BLOOD PRESSURE: 144 MMHG | DIASTOLIC BLOOD PRESSURE: 70 MMHG | WEIGHT: 201 LBS | HEART RATE: 84 BPM | OXYGEN SATURATION: 98 % | BODY MASS INDEX: 26.64 KG/M2 | TEMPERATURE: 97.4 F | RESPIRATION RATE: 18 BRPM | HEIGHT: 73 IN

## 2023-01-20 DIAGNOSIS — L72.3 SEBACEOUS CYST OF FINGER OF RIGHT HAND: Primary | ICD-10-CM

## 2023-01-20 RX ORDER — SULFAMETHOXAZOLE AND TRIMETHOPRIM 800; 160 MG/1; MG/1
1 TABLET ORAL EVERY 12 HOURS SCHEDULED
Qty: 14 TABLET | Refills: 0 | Status: SHIPPED | OUTPATIENT
Start: 2023-01-20 | End: 2023-01-27

## 2023-01-20 NOTE — PROGRESS NOTES
[facility] [facility]        NAME: Fern Patterson is a 57 y o  male  : 1960    MRN: [id number]  DATE: 2023  TIME: 3:52 PM    Assessment and Plan   Sebaceous cyst of finger of right hand [L72 3]  1  Sebaceous cyst of finger of right hand              Patient Instructions     Sebaceous cyst hand  Keflex as directed  Follow up with PCP in 3-5 days  Proceed to  ER if symptoms worsen  Chief Complaint     Chief Complaint   Patient presents with   • Hand Pain     Pt  C/O right hand pain that began yesterday after a spring hit him in B/L hands  He reports that the pain is mild, but has some swelling  History of Present Illness       73-year-old male who presents complaining of pain to the right hand  Patient states that he said he has had a lump to the right hand after having a metal wire spring back into it and hitting head  Patient declined x-rays at this time      Review of Systems   Review of Systems   Constitutional: Negative  HENT: Negative  Eyes: Negative  Respiratory: Negative  Negative for apnea, cough, choking, chest tightness, shortness of breath, wheezing and stridor  Cardiovascular: Negative  Negative for chest pain           Current Medications       Current Outpatient Medications:   •  atorvastatin (LIPITOR) 20 mg tablet, Take 20 mg by mouth daily, Disp: , Rfl:   •  lisinopril (ZESTRIL) 10 mg tablet, Take 10 mg by mouth daily, Disp: , Rfl:     Current Allergies     Allergies as of 2023 - Reviewed 2023   Allergen Reaction Noted   • Penicillins Anaphylaxis 2012            The following portions of the patient's history were reviewed and updated as appropriate: allergies, current medications, past family history, past medical history, past social history, past surgical history and problem list      Past Medical History:   Diagnosis Date   • GERD (gastroesophageal reflux disease)    • Nosebleed        Past Surgical History:   Procedure Laterality Date   • SINUS SURGERY         Family History   Problem Relation Age of Onset   • No Known Problems Mother    • No Known Problems Father          Medications have been verified  Objective   /70   Pulse 84   Temp (!) 97 4 °F (36 3 °C)   Resp 18   Ht 6' 1" (1 854 m)   Wt 91 2 kg (201 lb)   SpO2 98%   BMI 26 52 kg/m²        Physical Exam     Physical Exam  Constitutional:       General: He is not in acute distress  Appearance: He is well-developed  He is not diaphoretic  Cardiovascular:      Rate and Rhythm: Normal rate and regular rhythm  Heart sounds: Normal heart sounds  Pulmonary:      Effort: Pulmonary effort is normal  No respiratory distress  Breath sounds: Normal breath sounds  No wheezing or rales  Chest:      Chest wall: No tenderness  Musculoskeletal:      Left hand: Normal         Arms:       Cervical back: Normal range of motion and neck supple  Lymphadenopathy:      Cervical: No cervical adenopathy         Area cleaned and prepped in sterile fashion and sebaceus fluid obtained with a 14-gauge needle

## 2023-01-20 NOTE — PATIENT INSTRUCTIONS
Sebaceous cyst hand  Keflex as directed  Follow up with PCP in 3-5 days  Proceed to  ER if symptoms worsen